# Patient Record
Sex: FEMALE | Race: WHITE | NOT HISPANIC OR LATINO | URBAN - METROPOLITAN AREA
[De-identification: names, ages, dates, MRNs, and addresses within clinical notes are randomized per-mention and may not be internally consistent; named-entity substitution may affect disease eponyms.]

---

## 2019-09-27 ENCOUNTER — OUTPATIENT (OUTPATIENT)
Dept: OUTPATIENT SERVICES | Facility: HOSPITAL | Age: 59
LOS: 1 days | End: 2019-09-27
Payer: MEDICAID

## 2019-09-27 VITALS
SYSTOLIC BLOOD PRESSURE: 117 MMHG | WEIGHT: 160.06 LBS | RESPIRATION RATE: 16 BRPM | DIASTOLIC BLOOD PRESSURE: 83 MMHG | HEART RATE: 69 BPM | TEMPERATURE: 97 F | HEIGHT: 63 IN | OXYGEN SATURATION: 98 %

## 2019-09-27 DIAGNOSIS — Z01.818 ENCOUNTER FOR OTHER PREPROCEDURAL EXAMINATION: ICD-10-CM

## 2019-09-27 DIAGNOSIS — N84.0 POLYP OF CORPUS UTERI: Chronic | ICD-10-CM

## 2019-09-27 DIAGNOSIS — Z98.890 OTHER SPECIFIED POSTPROCEDURAL STATES: Chronic | ICD-10-CM

## 2019-09-27 DIAGNOSIS — N84.0 POLYP OF CORPUS UTERI: ICD-10-CM

## 2019-09-27 LAB
HCT VFR BLD CALC: 41.9 % — SIGNIFICANT CHANGE UP (ref 34.5–45)
HGB BLD-MCNC: 13.1 G/DL — SIGNIFICANT CHANGE UP (ref 11.5–15.5)
MCHC RBC-ENTMCNC: 29.2 PG — SIGNIFICANT CHANGE UP (ref 27–34)
MCHC RBC-ENTMCNC: 31.3 GM/DL — LOW (ref 32–36)
MCV RBC AUTO: 93.5 FL — SIGNIFICANT CHANGE UP (ref 80–100)
PLATELET # BLD AUTO: 201 K/UL — SIGNIFICANT CHANGE UP (ref 150–400)
RBC # BLD: 4.48 M/UL — SIGNIFICANT CHANGE UP (ref 3.8–5.2)
RBC # FLD: 13.2 % — SIGNIFICANT CHANGE UP (ref 10.3–14.5)
WBC # BLD: 5.2 K/UL — SIGNIFICANT CHANGE UP (ref 3.8–10.5)
WBC # FLD AUTO: 5.2 K/UL — SIGNIFICANT CHANGE UP (ref 3.8–10.5)

## 2019-09-27 PROCEDURE — 85027 COMPLETE CBC AUTOMATED: CPT

## 2019-09-27 PROCEDURE — G0463: CPT

## 2019-09-27 RX ORDER — SODIUM CHLORIDE 9 MG/ML
3 INJECTION INTRAMUSCULAR; INTRAVENOUS; SUBCUTANEOUS EVERY 8 HOURS
Refills: 0 | Status: DISCONTINUED | OUTPATIENT
Start: 2019-10-01 | End: 2019-10-22

## 2019-09-27 RX ORDER — LIDOCAINE HCL 20 MG/ML
0.2 VIAL (ML) INJECTION ONCE
Refills: 0 | Status: DISCONTINUED | OUTPATIENT
Start: 2019-10-01 | End: 2019-10-22

## 2019-09-27 NOTE — H&P PST ADULT - HISTORY OF PRESENT ILLNESS
This is a 59 year old female, primarily Algerian speaking with minimal English.  s/p ( '16): Excision Endometrial Polyp: benign. Current dx: recurrent Endometrial Polyp. Scheduled: This is a 59 year old female, primarily Monegasque speaking with minimal English.  s/p ( '16): Excision Endometrial Polyp: benign. Current dx: recurrent Endometrial Polyp. Scheduled: D+C/ Operative Hysteroscopy/ Polypectomy.

## 2019-09-27 NOTE — H&P PST ADULT - NSICDXPASTSURGICALHX_GEN_ALL_CORE_FT
PAST SURGICAL HISTORY:  Endometrial polyp ' 16  benign    S/P colonoscopy with polypectomy ' 2019  benign

## 2019-09-30 ENCOUNTER — TRANSCRIPTION ENCOUNTER (OUTPATIENT)
Age: 59
End: 2019-09-30

## 2019-09-30 RX ORDER — SODIUM CHLORIDE 9 MG/ML
1000 INJECTION, SOLUTION INTRAVENOUS
Refills: 0 | Status: DISCONTINUED | OUTPATIENT
Start: 2019-10-01 | End: 2019-10-22

## 2019-09-30 RX ORDER — HYDROMORPHONE HYDROCHLORIDE 2 MG/ML
0.5 INJECTION INTRAMUSCULAR; INTRAVENOUS; SUBCUTANEOUS
Refills: 0 | Status: DISCONTINUED | OUTPATIENT
Start: 2019-10-01 | End: 2019-10-01

## 2019-09-30 RX ORDER — ONDANSETRON 8 MG/1
4 TABLET, FILM COATED ORAL ONCE
Refills: 0 | Status: DISCONTINUED | OUTPATIENT
Start: 2019-10-01 | End: 2019-10-22

## 2019-09-30 RX ORDER — OXYCODONE HYDROCHLORIDE 5 MG/1
5 TABLET ORAL ONCE
Refills: 0 | Status: DISCONTINUED | OUTPATIENT
Start: 2019-10-01 | End: 2019-10-01

## 2019-10-01 ENCOUNTER — OUTPATIENT (OUTPATIENT)
Dept: OUTPATIENT SERVICES | Facility: HOSPITAL | Age: 59
LOS: 1 days | End: 2019-10-01
Payer: MEDICAID

## 2019-10-01 ENCOUNTER — RESULT REVIEW (OUTPATIENT)
Age: 59
End: 2019-10-01

## 2019-10-01 VITALS
TEMPERATURE: 97 F | OXYGEN SATURATION: 98 % | DIASTOLIC BLOOD PRESSURE: 78 MMHG | RESPIRATION RATE: 16 BRPM | HEART RATE: 82 BPM | SYSTOLIC BLOOD PRESSURE: 117 MMHG

## 2019-10-01 VITALS
HEART RATE: 77 BPM | OXYGEN SATURATION: 98 % | WEIGHT: 160.06 LBS | TEMPERATURE: 98 F | SYSTOLIC BLOOD PRESSURE: 115 MMHG | RESPIRATION RATE: 12 BRPM | HEIGHT: 63 IN | DIASTOLIC BLOOD PRESSURE: 77 MMHG

## 2019-10-01 DIAGNOSIS — N84.0 POLYP OF CORPUS UTERI: Chronic | ICD-10-CM

## 2019-10-01 DIAGNOSIS — Z98.890 OTHER SPECIFIED POSTPROCEDURAL STATES: Chronic | ICD-10-CM

## 2019-10-01 DIAGNOSIS — N84.0 POLYP OF CORPUS UTERI: ICD-10-CM

## 2019-10-01 PROCEDURE — 88305 TISSUE EXAM BY PATHOLOGIST: CPT | Mod: 26

## 2019-10-01 PROCEDURE — 88305 TISSUE EXAM BY PATHOLOGIST: CPT

## 2019-10-01 PROCEDURE — 58558 HYSTEROSCOPY BIOPSY: CPT

## 2019-10-01 RX ORDER — ACETAMINOPHEN 500 MG
1000 TABLET ORAL ONCE
Refills: 0 | Status: COMPLETED | OUTPATIENT
Start: 2019-10-01 | End: 2019-10-01

## 2019-10-01 RX ORDER — FAMOTIDINE 10 MG/ML
20 INJECTION INTRAVENOUS ONCE
Refills: 0 | Status: COMPLETED | OUTPATIENT
Start: 2019-10-01 | End: 2019-10-01

## 2019-10-01 RX ORDER — CELECOXIB 200 MG/1
200 CAPSULE ORAL ONCE
Refills: 0 | Status: COMPLETED | OUTPATIENT
Start: 2019-10-01 | End: 2019-10-01

## 2019-10-01 RX ADMIN — FAMOTIDINE 20 MILLIGRAM(S): 10 INJECTION INTRAVENOUS at 12:08

## 2019-10-01 RX ADMIN — Medication 1000 MILLIGRAM(S): at 11:41

## 2019-10-01 RX ADMIN — CELECOXIB 200 MILLIGRAM(S): 200 CAPSULE ORAL at 11:41

## 2019-10-01 NOTE — ASU DISCHARGE PLAN (ADULT/PEDIATRIC) - CALL YOUR DOCTOR IF YOU HAVE ANY OF THE FOLLOWING:
Pain not relieved by Medications/Fever greater than (need to indicate Fahrenheit or Celsius) Fever greater than (need to indicate Fahrenheit or Celsius)/Bleeding that does not stop/Pain not relieved by Medications

## 2019-10-01 NOTE — BRIEF OPERATIVE NOTE - NSICDXBRIEFPROCEDURE_GEN_ALL_CORE_FT
PROCEDURES:  Dilation and curettage of uterus with operative hysteroscopy and surgical removal of submucous leiomyoma 01-Oct-2019 14:02:24  Abril Barry

## 2019-10-01 NOTE — BRIEF OPERATIVE NOTE - OPERATION/FINDINGS
2 cm growth at the right cornua, r/o SM fibroid vs a polyp, also- a central adhesion anterior to posterior wall

## 2019-10-01 NOTE — ASU DISCHARGE PLAN (ADULT/PEDIATRIC) - CARE PROVIDER_API CALL
Abril Barry)  Obstetrics and Gynecology  [address] [facility], [address] [place] [id number]  Phone: [phone]  Fax: [phone]  Follow Up Time:

## 2019-10-03 LAB — SURGICAL PATHOLOGY STUDY: SIGNIFICANT CHANGE UP

## 2019-12-20 NOTE — ASU PREOP CHECKLIST - AS TEMP SITE
Pt to continue with prednisolone 4/day until durezol approved-- pt aware   note to PA team completed  Jong Aguiar RN 12:26 PM 12/20/19       oral

## 2020-05-21 NOTE — H&P PST ADULT - FALLEN IN THE PAST
Medication(s) Requested: Adderall  Last office visit: 5/5, no future appt   Last refill: 4/14  Is the patient due for refill of this medication(s): Yes  PDMP review: Criteria met. Forwarded to Physician/NIKOLE for signature.        no

## 2021-11-19 PROBLEM — Z64.1 PROBLEMS RELATED TO MULTIPARITY: Chronic | Status: ACTIVE | Noted: 2019-09-27

## 2021-11-26 PROBLEM — Z00.00 ENCOUNTER FOR PREVENTIVE HEALTH EXAMINATION: Status: ACTIVE | Noted: 2021-11-26

## 2022-01-12 ENCOUNTER — APPOINTMENT (OUTPATIENT)
Dept: SURGICAL ONCOLOGY | Facility: CLINIC | Age: 62
End: 2022-01-12
Payer: MEDICAID

## 2022-01-12 VITALS
SYSTOLIC BLOOD PRESSURE: 125 MMHG | DIASTOLIC BLOOD PRESSURE: 85 MMHG | BODY MASS INDEX: 28.85 KG/M2 | TEMPERATURE: 97.5 F | WEIGHT: 169 LBS | OXYGEN SATURATION: 96 % | HEART RATE: 81 BPM | RESPIRATION RATE: 17 BRPM | HEIGHT: 64 IN

## 2022-01-12 DIAGNOSIS — Z78.9 OTHER SPECIFIED HEALTH STATUS: ICD-10-CM

## 2022-01-12 DIAGNOSIS — Z80.0 FAMILY HISTORY OF MALIGNANT NEOPLASM OF DIGESTIVE ORGANS: ICD-10-CM

## 2022-01-12 DIAGNOSIS — Z80.3 FAMILY HISTORY OF MALIGNANT NEOPLASM OF BREAST: ICD-10-CM

## 2022-01-12 PROCEDURE — T1013A: CUSTOM

## 2022-01-12 PROCEDURE — 99203 OFFICE O/P NEW LOW 30 MIN: CPT

## 2022-01-12 NOTE — DATA REVIEWED
[FreeTextEntry1] : 4/11/2019 B/L DM and B/L US\par 12/04/2020 B/L SM\par 12/31/2020 R US\par 1/06/2022 B/L SM

## 2022-01-12 NOTE — PHYSICAL EXAM
[Normocephalic] : normocephalic [Atraumatic] : atraumatic [EOMI] : extra ocular movement intact [PERRL] : pupils equal, round and reactive to light [Sclera nonicteric] : sclera nonicteric [Supple] : supple [No Supraclavicular Adenopathy] : no supraclavicular adenopathy [No Cervical Adenopathy] : no cervical adenopathy [Examined in the supine and seated position] : examined in the supine and seated position [Bra Size: ___] : Bra Size: [unfilled] [Grade 2] : Ptosis Grade 2 [No Nipple Retraction] : no left nipple retraction [No Nipple Discharge] : no left nipple discharge [Breast Nipple Flattening] : nipples flattened [No Axillary Lymphadenopathy] : no left axillary lymphadenopathy [No Edema] : no edema [No Rashes] : no rashes [No Ulceration] : no ulceration [Breast Nipple Inversion] : nipples not inverted [Breast Nipple Retraction] : nipples not retracted [Breast Nipple Fissures] : nipples not fissured [Breast Abnormal Lactation (Galactorrhea)] : no galactorrhea [Breast Abnormal Secretion Bloody Fluid] : no bloody discharge [Breast Abnormal Secretion Serous Fluid] : no serous discharge [Breast Abnormal Secretion Opalescent Fluid] : no milky discharge [de-identified] : non-labored respirations  [de-identified] : Bilateral upper outer breast with 3-4 cm soft mass, circumscribed, mobile, appreciated while upright and supine

## 2022-01-12 NOTE — HISTORY OF PRESENT ILLNESS
[FreeTextEntry1] : The patient is a 62 year old female referred for consultation by Dr. Amna De Santiago for Right breast mass\par \par [place]ese interpret [id number]- Andria\par \par The patient notes that she never noticed a lump in her axilla. Her GYN had noticed it during an exam and sent her for imaging.\par \par Prior imaging:\par 4/11/2019 B/L DM ([facility]) revealed heterogeneously dense breasts \par  - Neg\par \par 4/11/2019 B/L US\par  - R 1:00 1.1 x 0.5 cm complicated cyst\par  - R axilla 9 mm node (palp)\par \par 12/04/2020 B/L SM\par  - Neg BR1\par \par 12/31/2020 R US\par  - R 1:00 N1 1.1 x 0.4 x 1.1 cm cyst with septation\par  - R axilla negative\par  - BR2\par \par 1/06/2022 B/L SM ([facility]) revealed heterogeneously dense breasts \par  - Neg\par  - BR1\par \par The lump is persistently palpable per her doctors, but the patient has not noticed it. Reports only sometimes a dull pain in the area. Denies any skin changes, nipple discharge.\par \par Reports family history of breast cancer maternal GM and aunt age 75 for both. Father with stomach cancer age 60s.\par \par She reports that she takes a medication for her stomach daily but does not know the name of it.

## 2022-01-12 NOTE — CONSULT LETTER
[Dear  ___] : Dear  [unfilled], [Consult Letter:] : I had the pleasure of evaluating your patient, [unfilled]. [Please see my note below.] : Please see my note below. [Consult Closing:] : Thank you very much for allowing me to participate in the care of this patient.  If you have any questions, please do not hesitate to contact me. [Sincerely,] : Sincerely, [DrSteve  ___] : Dr. PITT [FreeTextEntry3] : Sheila Joe MD\par Breast Surgeon\par Division of Surgical Oncology\par Department of Surgery\par [address] [place] [address]\[place] [address] \par Tel: [phone]\par Fax: [phone]\par Email: [email or web address]

## 2022-01-12 NOTE — REASON FOR VISIT
[Consultation] : a consultation visit [[employer] ] : provided by [employer]   [Time Spent: ____ minutes] : Total time spent using  services: [unfilled] minutes. The patient's primary language is not English thus required  services. [Interpreters_IDNumber] : [id number] [Interpreters_FullName] : Andria [TWNoteComboBox1] : Martiniquais

## 2022-01-12 NOTE — PAST MEDICAL HISTORY
[Postmenopausal] : The patient is postmenopausal [Menarche Age ____] : age at menarche was [unfilled] [Menopause Age____] : age at menopause was [unfilled] [Regular Cycle Intervals] : have been regular [Total Preg ___] : G[unfilled] [Live Births ___] : P[unfilled]  [Abortions ___] : Abortions:[unfilled] [Age At Live Birth ___] : Age at live birth: [unfilled] [History of Hormone Replacement Treatment] : has no history of hormone replacement treatment [FreeTextEntry6] : none [FreeTextEntry7] : 5 years [FreeTextEntry8] : 2 years

## 2022-01-12 NOTE — ASSESSMENT
[FreeTextEntry1] : The patient is a 62 year old female with bilateral breast masses\par \par Recent screening mammogram shows heterogeneously dense breasts and no suspicious findings. BIRADS 1. Prior imaging also reviewed without obvious findings in the area of question. \par \par Exam today confirms palpable masses on both upper outer breasts, but soft, symmetric, low suspicious for malignancy.\par \par Recommend supplemental screening with US given breast density and better evaluation of bilateral upper outer breasts.\par \par \par \par Plan:\par  - B/L US now\par  - RTO 6 months for follow-up or sooner if any new or worsening changes

## 2022-03-21 NOTE — H&P PST ADULT - DENTITION
Called patient discussed labs. A1c elevated at 10.5%. She admits to forgetting her nighttime dose of insulin and is not diligent in taking her mealtime insulin as well. I stressed the importance of this to prevent further complications of diabetes. I even recommend her seeing an endocrinologist for second opinion on her care regarding this. However at this present time she does not want to do this. Cholesterol elevated she states she is taking her Zocor daily. We may consider switching to Crestor in the future if this continues to remain elevated. She was unable to tolerate Lipitor in the past due to myalgias.   Follow-up as normal

## 2022-07-13 ENCOUNTER — APPOINTMENT (OUTPATIENT)
Dept: SURGICAL ONCOLOGY | Facility: CLINIC | Age: 62
End: 2022-07-13

## 2022-07-13 VITALS
HEIGHT: 64 IN | BODY MASS INDEX: 28.85 KG/M2 | DIASTOLIC BLOOD PRESSURE: 87 MMHG | OXYGEN SATURATION: 97 % | TEMPERATURE: 97.6 F | RESPIRATION RATE: 17 BRPM | SYSTOLIC BLOOD PRESSURE: 125 MMHG | HEART RATE: 81 BPM | WEIGHT: 169 LBS

## 2022-07-13 PROCEDURE — 99213 OFFICE O/P EST LOW 20 MIN: CPT

## 2022-07-13 PROCEDURE — T1013: CPT

## 2022-07-13 NOTE — HISTORY OF PRESENT ILLNESS
[FreeTextEntry1] : The patient is a 62 year old female referred for consultation by Dr. Amna De Santiago for Right breast mass, here for follow-up.\par \par [place]  John [id number].\par \par Prior history:\par The patient notes that she never noticed a lump in her axilla. Her GYN had noticed it during an exam and sent her for imaging.\par \par Prior imaging:\par 4/11/2019 B/L DM ([facility]) revealed heterogeneously dense breasts \par  - Neg\par \par 4/11/2019 B/L US\par  - R 1:00 1.1 x 0.5 cm complicated cyst\par  - R axilla 9 mm node (palp)\par \par 12/04/2020 B/L SM\par  - Neg BR1\par \par 12/31/2020 R US\par  - R 1:00 N1 1.1 x 0.4 x 1.1 cm cyst with septation\par  - R axilla negative\par  - BR2\par \par 1/06/2022 B/L SM ([facility]) revealed heterogeneously dense breasts \par  - Neg\par  - BR1\par \par The lump is persistently palpable per her doctors, but the patient has not noticed it. Reports only sometimes a dull pain in the area. Denies any skin changes, nipple discharge.\par \par Reports family history of breast cancer maternal GM and aunt age 75 for both. Father with stomach cancer age 60s.\par \par She reports that she takes a medication for her stomach daily but does not know the name of it.\par \par 1/27/2022 B/L US ([facility])\par - R 1:00 N1 1.4 x 0.7 x 1.3 cm complicated cyst\par - L negative\par - BR2\par \par Interval History:\par Today, denies any breast complaints. She reports that she still does not feel the axillary masses that we noticed previously. She admits to not doing regular breast self-exams.

## 2022-07-13 NOTE — PHYSICAL EXAM
[Normocephalic] : normocephalic [Atraumatic] : atraumatic [EOMI] : extra ocular movement intact [PERRL] : pupils equal, round and reactive to light [Sclera nonicteric] : sclera nonicteric [Supple] : supple [No Supraclavicular Adenopathy] : no supraclavicular adenopathy [No Cervical Adenopathy] : no cervical adenopathy [Examined in the supine and seated position] : examined in the supine and seated position [Bra Size: ___] : Bra Size: [unfilled] [Grade 2] : Ptosis Grade 2 [No Nipple Retraction] : no left nipple retraction [No Nipple Discharge] : no left nipple discharge [Breast Nipple Flattening] : nipples flattened [No Axillary Lymphadenopathy] : no left axillary lymphadenopathy [No Edema] : no edema [No Rashes] : no rashes [No Ulceration] : no ulceration [Breast Nipple Inversion] : nipples not inverted [Breast Nipple Retraction] : nipples not retracted [Breast Nipple Fissures] : nipples not fissured [Breast Abnormal Lactation (Galactorrhea)] : no galactorrhea [Breast Abnormal Secretion Bloody Fluid] : no bloody discharge [Breast Abnormal Secretion Serous Fluid] : no serous discharge [Breast Abnormal Secretion Opalescent Fluid] : no milky discharge [de-identified] : non-labored respirations  [de-identified] : Bilateral upper outer breast with 3-4 cm soft mass, circumscribed, mobile, appreciated while upright and supine, no significant change from last exam

## 2022-07-13 NOTE — CONSULT LETTER
[Dear  ___] : Dear  [unfilled], [Courtesy Letter:] : I had the pleasure of seeing your patient, [unfilled], in my office today. [Please see my note below.] : Please see my note below. [Consult Closing:] : Thank you very much for allowing me to participate in the care of this patient.  If you have any questions, please do not hesitate to contact me. [Sincerely,] : Sincerely, [DrSteve  ___] : Dr. PITT [FreeTextEntry3] : Sheila Joe MD\par Breast Surgeon\par Division of Surgical Oncology\par Department of Surgery\par [address] [place] [address]\[place] [address] \par Tel: [phone]\par Fax: [phone]\par Email: [email or web address]

## 2022-07-13 NOTE — ASSESSMENT
[FreeTextEntry1] : The patient is a 62 year old female with bilateral breast masses here for follow-up.\par \par Recent B/L US shows complicated right breast cyst. BIRADS 2. \par \par Exam today is stable from 6 months ago--B/L soft axillary masses, not appreciably grown in size. Imaging benign.\par \par We discussed that we should continue to monitor for now.\par \par Plan:\par  - B/L US and mammogram 1/2023\par  - RTO after imaging for follow-up or sooner if any new or worsening changes

## 2022-07-13 NOTE — REASON FOR VISIT
[Follow-Up: _____] : a [unfilled] follow-up visit [[employer] ] : provided by [employer]   [Time Spent: ____ minutes] : Total time spent using  services: [unfilled] minutes. The patient's primary language is not English thus required  services. [Interpreters_IDNumber] : [id number] [Interpreters_FullName] : John [TWNoteComboBox1] : Zimbabwean

## 2023-02-01 ENCOUNTER — APPOINTMENT (OUTPATIENT)
Dept: SURGICAL ONCOLOGY | Facility: CLINIC | Age: 63
End: 2023-02-01

## 2023-02-01 DIAGNOSIS — N63.20 UNSPECIFIED LUMP IN THE LEFT BREAST, UNSPECIFIED QUADRANT: ICD-10-CM

## 2023-02-01 DIAGNOSIS — N63.10 UNSPECIFIED LUMP IN THE RIGHT BREAST, UNSPECIFIED QUADRANT: ICD-10-CM

## 2023-06-15 PROBLEM — N60.01 CYST OF RIGHT BREAST: Status: ACTIVE | Noted: 2022-07-11

## 2023-06-20 ENCOUNTER — APPOINTMENT (OUTPATIENT)
Dept: SURGICAL ONCOLOGY | Facility: CLINIC | Age: 63
End: 2023-06-20
Payer: MEDICAID

## 2023-06-20 VITALS
HEIGHT: 64 IN | BODY MASS INDEX: 28.23 KG/M2 | HEART RATE: 77 BPM | DIASTOLIC BLOOD PRESSURE: 79 MMHG | WEIGHT: 165.34 LBS | SYSTOLIC BLOOD PRESSURE: 112 MMHG

## 2023-06-20 DIAGNOSIS — N60.01 SOLITARY CYST OF RIGHT BREAST: ICD-10-CM

## 2023-06-20 PROCEDURE — 99213 OFFICE O/P EST LOW 20 MIN: CPT

## 2023-06-20 PROCEDURE — T1013A: CUSTOM

## 2023-06-20 NOTE — ASSESSMENT
[FreeTextEntry1] : The patient is a 63 year old female with bilateral breast masses here for follow-up.\par \par 7/13/2022: Exam is stable from 6 months ago--B/L soft axillary masses, not appreciably grown in size. Imaging benign.\par \par Recent imaging revealed stable right breast cyst, BR2.\par \par Exam today is stable- still with bilateral fatty masses appreciated on exam, similar in size. Pt appreciates the mass on self exam--instructed pt to continue self exams and reach out if any changes. Pt expresses understanding.\par \par Plan:\par  - Next B/L SM/US 6/2024\par  - RTO after imaging for follow-up or sooner if any new or worsening changes

## 2023-06-20 NOTE — HISTORY OF PRESENT ILLNESS
[FreeTextEntry1] : The patient is a 63 year old female referred for consultation by Dr. Amna De Santiago for Right breast mass, here for follow-up.\par \par [place]  Traci [id number].\par \par Prior history:\par The patient notes that she never noticed a lump in her axilla. Her GYN had noticed it during an exam and sent her for imaging.\par \par Prior imaging:\par 4/11/2019 B/L DM ([facility]) revealed heterogeneously dense breasts \par  - Neg\par \par 4/11/2019 B/L US\par  - R 1:00 1.1 x 0.5 cm complicated cyst\par  - R axilla 9 mm node (palp)\par \par 12/04/2020 B/L SM\par  - Neg BR1\par \par 12/31/2020 R US\par  - R 1:00 N1 1.1 x 0.4 x 1.1 cm cyst with septation\par  - R axilla negative\par  - BR2\par \par 1/06/2022 B/L SM ([facility]) revealed heterogeneously dense breasts \par  - Neg\par  - BR1\par \par The lump is persistently palpable per her doctors, but the patient has not noticed it. Reports only sometimes a dull pain in the area. Denies any skin changes, nipple discharge.\par \par Reports family history of breast cancer maternal GM and aunt age 75 for both. Father with stomach cancer age 60s.\par \par She reports that she takes a medication for her stomach daily but does not know the name of it.\par \par 1/27/2022 B/L US ([facility])\par - R 1:00 N1 1.4 x 0.7 x 1.3 cm complicated cyst\par - L negative\par - BR2\par \par 7/13/2022:\par Today, denies any breast complaints. She reports that she still does not feel the axillary masses that we noticed previously. She admits to not doing regular breast self-exams. \par \par 6/8/2023 B/L SM ([facility])\par - B/L stable morphologically benign calcs w/o suspicious masses, architectural distortion, or significant calcifications are detected.\par \par 6/8/2023 B/L US\par - R 1:00 N1: 1.3 x 0.7 x 1.4 cm stable cyst previously 1.4 x 0.7 x 1.3 cm in 2022 and 1.1 x 0.4 x 1.1 cm in 2020.\par - L neg\par - B/L axilla neg\par - BR2\par \par Interval History:\par The patient reports that she still has no breast symptoms. Does self-exams at home occasionally. Has not noticed any abnormal masses, skin changes, or nipple discharge.\par

## 2023-06-20 NOTE — PHYSICAL EXAM
[Normocephalic] : normocephalic [Atraumatic] : atraumatic [EOMI] : extra ocular movement intact [PERRL] : pupils equal, round and reactive to light [Sclera nonicteric] : sclera nonicteric [Supple] : supple [No Supraclavicular Adenopathy] : no supraclavicular adenopathy [No Cervical Adenopathy] : no cervical adenopathy [Examined in the supine and seated position] : examined in the supine and seated position [Bra Size: ___] : Bra Size: [unfilled] [Grade 2] : Ptosis Grade 2 [No Nipple Retraction] : no left nipple retraction [No Nipple Discharge] : no left nipple discharge [Breast Nipple Flattening] : nipples flattened [No Axillary Lymphadenopathy] : no left axillary lymphadenopathy [No Edema] : no edema [No Rashes] : no rashes [No Ulceration] : no ulceration [Breast Nipple Inversion] : nipples not inverted [Breast Nipple Retraction] : nipples not retracted [Breast Nipple Fissures] : nipples not fissured [Breast Abnormal Lactation (Galactorrhea)] : no galactorrhea [Breast Abnormal Secretion Bloody Fluid] : no bloody discharge [Breast Abnormal Secretion Serous Fluid] : no serous discharge [Breast Abnormal Secretion Opalescent Fluid] : no milky discharge [de-identified] : non-labored respirations  [de-identified] : Bilateral upper outer breast with 3-4 cm soft mass, circumscribed, mobile, appreciated while upright and supine, no significant change from last exam

## 2023-06-20 NOTE — REASON FOR VISIT
[[employer] ] : provided by [employer]   [Time Spent: ____ minutes] : Total time spent using  services: [unfilled] minutes. The patient's primary language is not English thus required  services. [Interpreters_IDNumber] : [id number] [Interpreters_FullName] : Traci

## 2023-06-20 NOTE — CONSULT LETTER
[FreeTextEntry3] : Sheila Joe MD\par Breast Surgeon\par Division of Surgical Oncology\par Department of Surgery\par [address] [place] [address]\[place] [address] \par Tel: [phone]\par Fax: [phone]\par Email: [email or web address]

## 2023-06-21 ENCOUNTER — APPOINTMENT (OUTPATIENT)
Dept: SURGICAL ONCOLOGY | Facility: CLINIC | Age: 63
End: 2023-06-21
Payer: MEDICAID

## 2023-06-21 NOTE — HISTORY OF PRESENT ILLNESS
[FreeTextEntry1] : The patient is a 62 year old female referred for consultation by Dr. Amna De Santiago for Right breast mass, here for follow-up.\par \par [place]landic  John [id number].\par \par Prior history:\par The patient notes that she never noticed a lump in her axilla. Her GYN had noticed it during an exam and sent her for imaging.\par \par Prior imaging:\par 4/11/2019 B/L DM ([facility]) revealed heterogeneously dense breasts \par  - Neg\par \par 4/11/2019 B/L US\par  - R 1:00 1.1 x 0.5 cm complicated cyst\par  - R axilla 9 mm node (palp)\par \par 12/04/2020 B/L SM\par  - Neg BR1\par \par 12/31/2020 R US\par  - R 1:00 N1 1.1 x 0.4 x 1.1 cm cyst with septation\par  - R axilla negative\par  - BR2\par \par 1/06/2022 B/L SM ([facility]) revealed heterogeneously dense breasts \par  - Neg\par  - BR1\par \par The lump is persistently palpable per her doctors, but the patient has not noticed it. Reports only sometimes a dull pain in the area. Denies any skin changes, nipple discharge.\par \par Reports family history of breast cancer maternal GM and aunt age 75 for both. Father with stomach cancer age 60s.\par \par She reports that she takes a medication for her stomach daily but does not know the name of it.\par \par 1/27/2022 B/L US ([facility])\par - R 1:00 N1 1.4 x 0.7 x 1.3 cm complicated cyst\par - L negative\par - BR2\par \par Interval History:\par Today, denies any breast complaints. She reports that she still does not feel the axillary masses that we noticed previously. She admits to not doing regular breast self-exams.

## 2023-06-21 NOTE — CONSULT LETTER
[Dear  ___] : Dear  [unfilled], [Courtesy Letter:] : I had the pleasure of seeing your patient, [unfilled], in my office today. [Please see my note below.] : Please see my note below. [Consult Closing:] : Thank you very much for allowing me to participate in the care of this patient.  If you have any questions, please do not hesitate to contact me. [Sincerely,] : Sincerely, [DrSteve  ___] : Dr. PITT [FreeTextEntry3] : Sheila Joe MD\par Breast Surgeon\par Division of Surgical Oncology\par Department of Surgery\par [address] [place] [address]\[place] [address] \par Tel: [phone]\par Fax: [phone]\par Email: [email or web address]

## 2025-04-29 NOTE — H&P PST ADULT - NS PRO LAST MENSTRUAL DATE
Patient scheduled for surgery  5/15/25  at [facility]. Instructions and PAT's gone over with and handed to the patient.      Bloodwork   Abx   SOC   EKG    Post op scheduled     
age 51